# Patient Record
Sex: FEMALE | Race: WHITE | NOT HISPANIC OR LATINO | URBAN - NONMETROPOLITAN AREA
[De-identification: names, ages, dates, MRNs, and addresses within clinical notes are randomized per-mention and may not be internally consistent; named-entity substitution may affect disease eponyms.]

---

## 2020-08-18 ENCOUNTER — OFFICE VISIT (OUTPATIENT)
Dept: URBAN - NONMETROPOLITAN AREA CLINIC 2 | Facility: CLINIC | Age: 80
End: 2020-08-18
Payer: MEDICARE

## 2020-08-18 DIAGNOSIS — K58.0 IRRITABLE BOWEL SYNDROME WITH DIARRHEA: ICD-10-CM

## 2020-08-18 DIAGNOSIS — R74.8 ABNORMAL ALKALINE PHOSPHATASE TEST: ICD-10-CM

## 2020-08-18 DIAGNOSIS — Z86.010 PERSONAL HISTORY OF COLONIC POLYPS: ICD-10-CM

## 2020-08-18 PROCEDURE — 1036F TOBACCO NON-USER: CPT | Performed by: INTERNAL MEDICINE

## 2020-08-18 PROCEDURE — G9903 PT SCRN TBCO ID AS NON USER: HCPCS | Performed by: INTERNAL MEDICINE

## 2020-08-18 PROCEDURE — G8427 DOCREV CUR MEDS BY ELIG CLIN: HCPCS | Performed by: INTERNAL MEDICINE

## 2020-08-18 PROCEDURE — G8420 CALC BMI NORM PARAMETERS: HCPCS | Performed by: INTERNAL MEDICINE

## 2020-08-18 PROCEDURE — 99214 OFFICE O/P EST MOD 30 MIN: CPT | Performed by: INTERNAL MEDICINE

## 2020-08-18 RX ORDER — TURMERIC 400 MG
CAPSULE ORAL
Qty: 0 | Refills: 0 | Status: ACTIVE | COMMUNITY
Start: 1900-01-01

## 2020-08-18 RX ORDER — GLUCOSAMINE HCL/CHONDROITIN SU 500-400 MG
CAPSULE ORAL
Qty: 0 | Refills: 0 | Status: ACTIVE | COMMUNITY
Start: 1900-01-01

## 2020-08-18 RX ORDER — ERGOCALCIFEROL 1.25 MG/1
TAKE 1 CAPSULE (50,000 UNIT) BY ORAL ROUTE ONCE WEEKLY CAPSULE ORAL
Qty: 0 | Refills: 0 | Status: ACTIVE | COMMUNITY
Start: 1900-01-01

## 2020-08-18 RX ORDER — DICYCLOMINE HYDROCHLORIDE 10 MG/1
1 CAPSULE CAPSULE ORAL
Qty: 90 CAPSULE | Refills: 6 | OUTPATIENT
Start: 2020-08-18 | End: 2021-03-16

## 2020-08-18 NOTE — HPI-TODAY'S VISIT:
The patient presents today for follow-up of her diarrhea.  Since our last visit, she has been doing quite well.  She is actually stopped taking the dicyclomine, but she has added in the Metamucil.  She does feel that adding the Metamucil has given her more formed to her stool.  She still will have to have several bowel movements in the morning during her walk.  She is not having 1, formed complete bowel movement.  She denies any blood in her stool.  She does feel that stress has affected her bowels as well.  At our last visit, we also checked her liver enzymes, and these have returned to normal.  She is due for repeat colonoscopy in 2021.  Overall, she is feeling well today, but continues to have occasional issues with frequency of bowel movements.

## 2021-02-23 ENCOUNTER — LAB OUTSIDE AN ENCOUNTER (OUTPATIENT)
Dept: URBAN - NONMETROPOLITAN AREA CLINIC 2 | Facility: CLINIC | Age: 81
End: 2021-02-23

## 2021-02-23 ENCOUNTER — OFFICE VISIT (OUTPATIENT)
Dept: URBAN - NONMETROPOLITAN AREA CLINIC 2 | Facility: CLINIC | Age: 81
End: 2021-02-23
Payer: MEDICARE

## 2021-02-23 DIAGNOSIS — Z86.010 PERSONAL HISTORY OF COLONIC POLYPS: ICD-10-CM

## 2021-02-23 DIAGNOSIS — R10.13 DYSPEPSIA: ICD-10-CM

## 2021-02-23 DIAGNOSIS — R94.5 ELEVATED LFTS: ICD-10-CM

## 2021-02-23 DIAGNOSIS — K58.9 IBS (IRRITABLE BOWEL SYNDROME): ICD-10-CM

## 2021-02-23 DIAGNOSIS — R19.7 DIARRHEA: ICD-10-CM

## 2021-02-23 PROCEDURE — 99214 OFFICE O/P EST MOD 30 MIN: CPT | Performed by: INTERNAL MEDICINE

## 2021-02-23 RX ORDER — DICYCLOMINE HYDROCHLORIDE 10 MG/1
1 CAPSULE CAPSULE ORAL
Qty: 90 CAPSULE | Refills: 6 | Status: ACTIVE | COMMUNITY
Start: 2020-08-18 | End: 2021-03-16

## 2021-02-23 RX ORDER — GLUCOSAMINE HCL/CHONDROITIN SU 500-400 MG
CAPSULE ORAL
Qty: 0 | Refills: 0 | Status: ACTIVE | COMMUNITY
Start: 1900-01-01

## 2021-02-23 RX ORDER — ERGOCALCIFEROL 1.25 MG/1
TAKE 1 CAPSULE (50,000 UNIT) BY ORAL ROUTE ONCE WEEKLY CAPSULE ORAL
Qty: 0 | Refills: 0 | Status: ACTIVE | COMMUNITY
Start: 1900-01-01

## 2021-02-23 RX ORDER — TURMERIC 400 MG
CAPSULE ORAL
Qty: 0 | Refills: 0 | Status: ACTIVE | COMMUNITY
Start: 1900-01-01

## 2021-02-23 NOTE — HPI-TODAY'S VISIT:
2-23-21 The patient presents today for follow-up of her diarrhea and elevated liver enzymes.  She also has a history of colon polyps.  She is due for repeat colonoscopy today.  Her diarrhea is normally well controlled with Metamucil.  She is not taking dicyclomine.  She does occasionally have urgency when she is walking in the morning, but otherwise her bowels have been regular.  We have discussed taking the dicyclomine prior to her morning walk.  She is also been having problems with increased dyspepsia.  She occasionally has days where she feels that she needs to burp.  She is not taking anything for reflux, and she has never had an EGD.  We will proceed with EGD when we do her colonoscopy.  Overall, she is feeling well today, and she is willing to proceed with her procedures.

## 2021-03-10 ENCOUNTER — TELEPHONE ENCOUNTER (OUTPATIENT)
Dept: URBAN - NONMETROPOLITAN AREA CLINIC 2 | Facility: CLINIC | Age: 81
End: 2021-03-10

## 2021-03-11 ENCOUNTER — TELEPHONE ENCOUNTER (OUTPATIENT)
Dept: URBAN - NONMETROPOLITAN AREA CLINIC 2 | Facility: CLINIC | Age: 81
End: 2021-03-11

## 2021-03-18 ENCOUNTER — OFFICE VISIT (OUTPATIENT)
Dept: URBAN - NONMETROPOLITAN AREA SURGERY CENTER 1 | Facility: SURGERY CENTER | Age: 81
End: 2021-03-18
Payer: MEDICARE

## 2021-03-18 ENCOUNTER — CLAIMS CREATED FROM THE CLAIM WINDOW (OUTPATIENT)
Dept: URBAN - METROPOLITAN AREA CLINIC 4 | Facility: CLINIC | Age: 81
End: 2021-03-18
Payer: MEDICARE

## 2021-03-18 DIAGNOSIS — K63.89 MASS OF HEPATIC FLEXURE OF COLON: ICD-10-CM

## 2021-03-18 DIAGNOSIS — K21.00 GASTRO-ESOPHAGEAL REFLUX DISEASE WITH ESOPHAGITIS, WITHOUT BLEEDING: ICD-10-CM

## 2021-03-18 DIAGNOSIS — K21.9 ACID REFLUX: ICD-10-CM

## 2021-03-18 DIAGNOSIS — R19.7 ACUTE DIARRHEA: ICD-10-CM

## 2021-03-18 DIAGNOSIS — K63.89 BACTERIAL OVERGROWTH SYNDROME: ICD-10-CM

## 2021-03-18 DIAGNOSIS — K31.89 ISOLATED IDIOPATHIC GRANULOMA OF STOMACH: ICD-10-CM

## 2021-03-18 PROCEDURE — 88313 SPECIAL STAINS GROUP 2: CPT | Performed by: PATHOLOGY

## 2021-03-18 PROCEDURE — 43239 EGD BIOPSY SINGLE/MULTIPLE: CPT | Performed by: INTERNAL MEDICINE

## 2021-03-18 PROCEDURE — 88342 IMHCHEM/IMCYTCHM 1ST ANTB: CPT | Performed by: PATHOLOGY

## 2021-03-18 PROCEDURE — G8907 PT DOC NO EVENTS ON DISCHARG: HCPCS | Performed by: INTERNAL MEDICINE

## 2021-03-18 PROCEDURE — 45380 COLONOSCOPY AND BIOPSY: CPT | Performed by: INTERNAL MEDICINE

## 2021-03-18 PROCEDURE — 45385 COLONOSCOPY W/LESION REMOVAL: CPT | Performed by: INTERNAL MEDICINE

## 2021-03-18 PROCEDURE — 88305 TISSUE EXAM BY PATHOLOGIST: CPT | Performed by: PATHOLOGY

## 2021-03-18 RX ORDER — ERGOCALCIFEROL 1.25 MG/1
TAKE 1 CAPSULE (50,000 UNIT) BY ORAL ROUTE ONCE WEEKLY CAPSULE ORAL
Qty: 0 | Refills: 0 | Status: ACTIVE | COMMUNITY
Start: 1900-01-01

## 2021-03-18 RX ORDER — TURMERIC 400 MG
CAPSULE ORAL
Qty: 0 | Refills: 0 | Status: ACTIVE | COMMUNITY
Start: 1900-01-01

## 2021-03-18 RX ORDER — GLUCOSAMINE HCL/CHONDROITIN SU 500-400 MG
CAPSULE ORAL
Qty: 0 | Refills: 0 | Status: ACTIVE | COMMUNITY
Start: 1900-01-01

## 2021-06-24 ENCOUNTER — WEB ENCOUNTER (OUTPATIENT)
Dept: URBAN - NONMETROPOLITAN AREA CLINIC 13 | Facility: CLINIC | Age: 81
End: 2021-06-24

## 2021-06-24 ENCOUNTER — OFFICE VISIT (OUTPATIENT)
Dept: URBAN - NONMETROPOLITAN AREA CLINIC 13 | Facility: CLINIC | Age: 81
End: 2021-06-24
Payer: MEDICARE

## 2021-06-24 VITALS
SYSTOLIC BLOOD PRESSURE: 139 MMHG | BODY MASS INDEX: 23.15 KG/M2 | DIASTOLIC BLOOD PRESSURE: 83 MMHG | HEART RATE: 67 BPM | WEIGHT: 122.6 LBS | TEMPERATURE: 97.7 F | HEIGHT: 61 IN

## 2021-06-24 DIAGNOSIS — K58.9 IBS (IRRITABLE BOWEL SYNDROME): ICD-10-CM

## 2021-06-24 DIAGNOSIS — R19.7 DIARRHEA: ICD-10-CM

## 2021-06-24 DIAGNOSIS — R10.13 DYSPEPSIA: ICD-10-CM

## 2021-06-24 DIAGNOSIS — R94.5 ELEVATED LFTS: ICD-10-CM

## 2021-06-24 DIAGNOSIS — Z86.010 PERSONAL HISTORY OF COLONIC POLYPS: ICD-10-CM

## 2021-06-24 PROCEDURE — 99214 OFFICE O/P EST MOD 30 MIN: CPT | Performed by: INTERNAL MEDICINE

## 2021-06-24 RX ORDER — TURMERIC 400 MG
CAPSULE ORAL
Qty: 0 | Refills: 0 | Status: ACTIVE | COMMUNITY
Start: 1900-01-01

## 2021-06-24 RX ORDER — GLUCOSAMINE HCL/CHONDROITIN SU 500-400 MG
CAPSULE ORAL
Qty: 0 | Refills: 0 | Status: ACTIVE | COMMUNITY
Start: 1900-01-01

## 2021-06-24 RX ORDER — ERGOCALCIFEROL 1.25 MG/1
TAKE 1 CAPSULE (50,000 UNIT) BY ORAL ROUTE ONCE WEEKLY CAPSULE ORAL
Qty: 0 | Refills: 0 | Status: ACTIVE | COMMUNITY
Start: 1900-01-01

## 2021-06-24 RX ORDER — FAMOTIDINE 20 MG/1
1 TABLET AT BEDTIME TABLET, FILM COATED ORAL ONCE A DAY
Qty: 90 TABLET | Refills: 3 | OUTPATIENT
Start: 2021-06-24

## 2021-06-24 RX ORDER — DICYCLOMINE HYDROCHLORIDE 10 MG/1
1 CAPSULE CAPSULE ORAL
Qty: 30 CAPSULE | Refills: 6 | OUTPATIENT
Start: 2021-06-24 | End: 2023-03-16

## 2021-06-24 NOTE — HPI-TODAY'S VISIT:
2-23-21 The patient presents today for follow-up of her diarrhea and elevated liver enzymes.  She also has a history of colon polyps.  She is due for repeat colonoscopy today.  Her diarrhea is normally well controlled with Metamucil.  She is not taking dicyclomine.  She does occasionally have urgency when she is walking in the morning, but otherwise her bowels have been regular.  We have discussed taking the dicyclomine prior to her morning walk.  She is also been having problems with increased dyspepsia.  She occasionally has days where she feels that she needs to burp.  She is not taking anything for reflux, and she has never had an EGD.  We will proceed with EGD when we do her colonoscopy.  Overall, she is feeling well today, and she is willing to proceed with her procedures. 6/24/2021 The patient presents today for follow-up of her EGD and colonoscopy.  She was found to have 1 small benign colon polyp and some mild reflux esophagitis.  We have discussed these results today.  She does continue to have some postnasal drip with some mild dyspepsia.  We have discussed starting on Pepcid, and she does want to do this today.  Her bowels have been fairly well controlled with Metamucil in the evening and dicyclomine as needed.  She will use dicyclomine if she is going for a walk or if she is going out for the day.  Overall, she is doing quite well and her GI symptoms are controlled.

## 2021-08-17 ENCOUNTER — OFFICE VISIT (OUTPATIENT)
Dept: URBAN - NONMETROPOLITAN AREA CLINIC 2 | Facility: CLINIC | Age: 81
End: 2021-08-17

## 2022-06-15 ENCOUNTER — WEB ENCOUNTER (OUTPATIENT)
Dept: URBAN - NONMETROPOLITAN AREA CLINIC 2 | Facility: CLINIC | Age: 82
End: 2022-06-15

## 2022-06-21 ENCOUNTER — OFFICE VISIT (OUTPATIENT)
Dept: URBAN - NONMETROPOLITAN AREA CLINIC 2 | Facility: CLINIC | Age: 82
End: 2022-06-21
Payer: MEDICARE

## 2022-06-21 VITALS
BODY MASS INDEX: 23.41 KG/M2 | TEMPERATURE: 97.2 F | SYSTOLIC BLOOD PRESSURE: 145 MMHG | HEIGHT: 61 IN | DIASTOLIC BLOOD PRESSURE: 83 MMHG | HEART RATE: 73 BPM | WEIGHT: 124 LBS

## 2022-06-21 DIAGNOSIS — R13.19 ESOPHAGEAL DYSPHAGIA: ICD-10-CM

## 2022-06-21 DIAGNOSIS — Z86.010 PERSONAL HISTORY OF COLONIC POLYPS: ICD-10-CM

## 2022-06-21 DIAGNOSIS — R94.5 ELEVATED LFTS: ICD-10-CM

## 2022-06-21 DIAGNOSIS — R10.13 DYSPEPSIA: ICD-10-CM

## 2022-06-21 DIAGNOSIS — K58.9 IBS (IRRITABLE BOWEL SYNDROME): ICD-10-CM

## 2022-06-21 DIAGNOSIS — R19.7 DIARRHEA: ICD-10-CM

## 2022-06-21 PROCEDURE — 99214 OFFICE O/P EST MOD 30 MIN: CPT | Performed by: INTERNAL MEDICINE

## 2022-06-21 RX ORDER — ERGOCALCIFEROL 1.25 MG/1
TAKE 1 CAPSULE (50,000 UNIT) BY ORAL ROUTE ONCE WEEKLY CAPSULE ORAL
Qty: 0 | Refills: 0 | Status: ACTIVE | COMMUNITY
Start: 1900-01-01

## 2022-06-21 RX ORDER — TURMERIC 400 MG
CAPSULE ORAL
Qty: 0 | Refills: 0 | Status: ACTIVE | COMMUNITY
Start: 1900-01-01

## 2022-06-21 RX ORDER — DICYCLOMINE HYDROCHLORIDE 10 MG/1
1 CAPSULE CAPSULE ORAL
Qty: 30 CAPSULE | Refills: 6 | Status: ACTIVE | COMMUNITY
Start: 2021-06-24 | End: 2023-03-16

## 2022-06-21 RX ORDER — DICYCLOMINE HYDROCHLORIDE 10 MG/1
1 CAPSULE CAPSULE ORAL
Qty: 30 CAPSULE | Refills: 6
Start: 2021-06-24 | End: 2024-03-12

## 2022-06-21 RX ORDER — FAMOTIDINE 20 MG/1
1 TABLET AT BEDTIME TABLET, FILM COATED ORAL ONCE A DAY
Qty: 90 TABLET | Refills: 3
Start: 2021-06-24

## 2022-06-21 RX ORDER — FAMOTIDINE 20 MG/1
1 TABLET AT BEDTIME TABLET, FILM COATED ORAL ONCE A DAY
Qty: 90 TABLET | Refills: 3 | Status: ACTIVE | COMMUNITY
Start: 2021-06-24

## 2022-06-21 RX ORDER — GLUCOSAMINE HCL/CHONDROITIN SU 500-400 MG
CAPSULE ORAL
Qty: 0 | Refills: 0 | Status: ACTIVE | COMMUNITY
Start: 1900-01-01

## 2022-06-21 NOTE — HPI-TODAY'S VISIT:
2-23-21 The patient presents today for follow-up of her diarrhea and elevated liver enzymes.  She also has a history of colon polyps.  She is due for repeat colonoscopy today.  Her diarrhea is normally well controlled with Metamucil.  She is not taking dicyclomine.  She does occasionally have urgency when she is walking in the morning, but otherwise her bowels have been regular.  We have discussed taking the dicyclomine prior to her morning walk.  She is also been having problems with increased dyspepsia.  She occasionally has days where she feels that she needs to burp.  She is not taking anything for reflux, and she has never had an EGD.  We will proceed with EGD when we do her colonoscopy.  Overall, she is feeling well today, and she is willing to proceed with her procedures. 6/24/2021 The patient presents today for follow-up of her EGD and colonoscopy.  She was found to have 1 small benign colon polyp and some mild reflux esophagitis.  We have discussed these results today.  She does continue to have some postnasal drip with some mild dyspepsia.  We have discussed starting on Pepcid, and she does want to do this today.  Her bowels have been fairly well controlled with Metamucil in the evening and dicyclomine as needed.  She will use dicyclomine if she is going for a walk or if she is going out for the day.  Overall, she is doing quite well and her GI symptoms are controlled. 6/21/2022 The patient presents today for follow-up of her diarrhea and reflux.  Since her last visit, she is doing quite well on dicyclomine in the morning.  She will take another dicyclomine when she goes out.  Sometimes she feels that she needs to, and sometimes she feels that 1 controls her symptoms.  Her reflux has been well controlled on famotidine at night.  She does occasionally feel that food gets stuck in her esophagus.  She feels that she has a very small esophagus.  We have discussed repeating her EGD with dilation, but she does not think she needs to proceed with this at this time.  Overall, she continues to do quite well.  We will see her back in the office in 6 months.  If she does have worsening problems with dysphagia, then I would schedule her for an EGD sooner.

## 2022-06-24 ENCOUNTER — OFFICE VISIT (OUTPATIENT)
Dept: URBAN - NONMETROPOLITAN AREA CLINIC 13 | Facility: CLINIC | Age: 82
End: 2022-06-24

## 2023-01-03 ENCOUNTER — OFFICE VISIT (OUTPATIENT)
Dept: URBAN - NONMETROPOLITAN AREA CLINIC 2 | Facility: CLINIC | Age: 83
End: 2023-01-03
Payer: MEDICARE

## 2023-01-03 VITALS
WEIGHT: 126 LBS | HEIGHT: 61 IN | HEART RATE: 65 BPM | SYSTOLIC BLOOD PRESSURE: 146 MMHG | BODY MASS INDEX: 23.79 KG/M2 | DIASTOLIC BLOOD PRESSURE: 81 MMHG | TEMPERATURE: 97 F

## 2023-01-03 DIAGNOSIS — K58.9 IBS (IRRITABLE BOWEL SYNDROME): ICD-10-CM

## 2023-01-03 DIAGNOSIS — R10.13 DYSPEPSIA: ICD-10-CM

## 2023-01-03 DIAGNOSIS — R13.19 ESOPHAGEAL DYSPHAGIA: ICD-10-CM

## 2023-01-03 DIAGNOSIS — R19.7 DIARRHEA: ICD-10-CM

## 2023-01-03 DIAGNOSIS — R94.5 ELEVATED LFTS: ICD-10-CM

## 2023-01-03 DIAGNOSIS — Z86.010 PERSONAL HISTORY OF COLONIC POLYPS: ICD-10-CM

## 2023-01-03 PROBLEM — 428283002: Status: ACTIVE | Noted: 2020-08-18

## 2023-01-03 PROBLEM — 162031009: Status: ACTIVE | Noted: 2021-02-23

## 2023-01-03 PROCEDURE — 99214 OFFICE O/P EST MOD 30 MIN: CPT | Performed by: INTERNAL MEDICINE

## 2023-01-03 RX ORDER — DICYCLOMINE HYDROCHLORIDE 10 MG/1
1 CAPSULE CAPSULE ORAL
Qty: 30 CAPSULE | Refills: 6 | Status: ACTIVE | COMMUNITY
Start: 2021-06-24 | End: 2024-03-12

## 2023-01-03 RX ORDER — GLUCOSAMINE HCL/CHONDROITIN SU 500-400 MG
CAPSULE ORAL
Qty: 0 | Refills: 0 | Status: ACTIVE | COMMUNITY
Start: 1900-01-01

## 2023-01-03 RX ORDER — ERGOCALCIFEROL 1.25 MG/1
TAKE 1 CAPSULE (50,000 UNIT) BY ORAL ROUTE ONCE WEEKLY CAPSULE ORAL
Qty: 0 | Refills: 0 | Status: ACTIVE | COMMUNITY
Start: 1900-01-01

## 2023-01-03 RX ORDER — TURMERIC 400 MG
CAPSULE ORAL
Qty: 0 | Refills: 0 | Status: ACTIVE | COMMUNITY
Start: 1900-01-01

## 2023-01-03 RX ORDER — FAMOTIDINE 20 MG/1
1 TABLET AT BEDTIME TABLET, FILM COATED ORAL ONCE A DAY
Qty: 90 TABLET | Refills: 3 | Status: ACTIVE | COMMUNITY
Start: 2021-06-24

## 2023-01-03 NOTE — HPI-TODAY'S VISIT:
2-23-21 The patient presents today for follow-up of her diarrhea and elevated liver enzymes.  She also has a history of colon polyps.  She is due for repeat colonoscopy today.  Her diarrhea is normally well controlled with Metamucil.  She is not taking dicyclomine.  She does occasionally have urgency when she is walking in the morning, but otherwise her bowels have been regular.  We have discussed taking the dicyclomine prior to her morning walk.  She is also been having problems with increased dyspepsia.  She occasionally has days where she feels that she needs to burp.  She is not taking anything for reflux, and she has never had an EGD.  We will proceed with EGD when we do her colonoscopy.  Overall, she is feeling well today, and she is willing to proceed with her procedures. 6/24/2021 The patient presents today for follow-up of her EGD and colonoscopy.  She was found to have 1 small benign colon polyp and some mild reflux esophagitis.  We have discussed these results today.  She does continue to have some postnasal drip with some mild dyspepsia.  We have discussed starting on Pepcid, and she does want to do this today.  Her bowels have been fairly well controlled with Metamucil in the evening and dicyclomine as needed.  She will use dicyclomine if she is going for a walk or if she is going out for the day.  Overall, she is doing quite well and her GI symptoms are controlled. 6/21/2022 The patient presents today for follow-up of her diarrhea and reflux.  Since her last visit, she is doing quite well on dicyclomine in the morning.  She will take another dicyclomine when she goes out.  Sometimes she feels that she needs to, and sometimes she feels that 1 controls her symptoms.  Her reflux has been well controlled on famotidine at night.  She does occasionally feel that food gets stuck in her esophagus.  She feels that she has a very small esophagus.  We have discussed repeating her EGD with dilation, but she does not think she needs to proceed with this at this time.  Overall, she continues to do quite well.  We will see her back in the office in 6 months.  If she does have worsening problems with dysphagia, then I would schedule her for an EGD sooner. 1/3/2023 The patient presents today for follow-up of her reflux and diarrhea.  Since her last visit, she is doing well on famotidine 20 mg at night.  She does still feel that occasionally food gets stuck in the top of her esophagus.  She does feel that she has difficulty swallowing sometimes.  She does not think we need to repeat her EGD with dilation at this time.  She does continue to struggle with her diarrhea.  She is taking the Metamucil daily, and she does think that this helps.  She often forgets to take the dicyclomine.  Today, we have discussed taking the dicyclomine on a more regular basis.  We will see her back in the office in 6 months for further evaluation.

## 2023-01-04 ENCOUNTER — TELEPHONE ENCOUNTER (OUTPATIENT)
Dept: URBAN - NONMETROPOLITAN AREA CLINIC 2 | Facility: CLINIC | Age: 83
End: 2023-01-04

## 2023-04-20 ENCOUNTER — TELEPHONE ENCOUNTER (OUTPATIENT)
Dept: URBAN - NONMETROPOLITAN AREA CLINIC 2 | Facility: CLINIC | Age: 83
End: 2023-04-20

## 2023-04-20 RX ORDER — LACTOBACIL 2/BIFIDO 1/S.THERMO 900B CELL
1 PACKET PACKET (EA) ORAL DAILY
Qty: 30 PACKET | Refills: 11 | OUTPATIENT
Start: 2023-04-20 | End: 2024-04-14

## 2023-06-05 ENCOUNTER — OFFICE VISIT (OUTPATIENT)
Dept: URBAN - NONMETROPOLITAN AREA CLINIC 2 | Facility: CLINIC | Age: 83
End: 2023-06-05

## 2023-06-28 ENCOUNTER — ERX REFILL RESPONSE (OUTPATIENT)
Dept: URBAN - NONMETROPOLITAN AREA CLINIC 2 | Facility: CLINIC | Age: 83
End: 2023-06-28

## 2023-06-28 RX ORDER — FAMOTIDINE 20 MG/1
1 TABLET AT BEDTIME TABLET, FILM COATED ORAL ONCE A DAY
Qty: 90 TABLET | Refills: 3 | OUTPATIENT

## 2023-08-08 ENCOUNTER — OFFICE VISIT (OUTPATIENT)
Dept: URBAN - NONMETROPOLITAN AREA CLINIC 2 | Facility: CLINIC | Age: 83
End: 2023-08-08
Payer: MEDICARE

## 2023-08-08 ENCOUNTER — LAB OUTSIDE AN ENCOUNTER (OUTPATIENT)
Dept: URBAN - NONMETROPOLITAN AREA CLINIC 2 | Facility: CLINIC | Age: 83
End: 2023-08-08

## 2023-08-08 VITALS
DIASTOLIC BLOOD PRESSURE: 73 MMHG | SYSTOLIC BLOOD PRESSURE: 143 MMHG | WEIGHT: 121.6 LBS | TEMPERATURE: 98.7 F | BODY MASS INDEX: 22.96 KG/M2 | HEART RATE: 71 BPM | HEIGHT: 61 IN

## 2023-08-08 DIAGNOSIS — R13.19 ESOPHAGEAL DYSPHAGIA: ICD-10-CM

## 2023-08-08 DIAGNOSIS — K58.1 IBS: ICD-10-CM

## 2023-08-08 DIAGNOSIS — R94.5 ELEVATED LFTS: ICD-10-CM

## 2023-08-08 DIAGNOSIS — R19.7 DIARRHEA: ICD-10-CM

## 2023-08-08 PROBLEM — 40890009: Status: ACTIVE | Noted: 2022-06-21

## 2023-08-08 PROCEDURE — 99214 OFFICE O/P EST MOD 30 MIN: CPT | Performed by: INTERNAL MEDICINE

## 2023-08-08 RX ORDER — TURMERIC 400 MG
CAPSULE ORAL
Qty: 0 | Refills: 0 | Status: ACTIVE | COMMUNITY
Start: 1900-01-01

## 2023-08-08 RX ORDER — DICYCLOMINE HYDROCHLORIDE 10 MG/1
1 CAPSULE CAPSULE ORAL
Qty: 30 CAPSULE | Refills: 6 | Status: ACTIVE | COMMUNITY
Start: 2021-06-24 | End: 2024-03-12

## 2023-08-08 RX ORDER — ERGOCALCIFEROL 1.25 MG/1
TAKE 1 CAPSULE (50,000 UNIT) BY ORAL ROUTE ONCE WEEKLY CAPSULE ORAL
Qty: 0 | Refills: 0 | Status: ACTIVE | COMMUNITY
Start: 1900-01-01

## 2023-08-08 RX ORDER — LACTOBACIL 2/BIFIDO 1/S.THERMO 900B CELL
1 PACKET PACKET (EA) ORAL DAILY
Qty: 30 PACKET | Refills: 11 | Status: ACTIVE | COMMUNITY
Start: 2023-04-20 | End: 2024-04-14

## 2023-08-08 RX ORDER — FAMOTIDINE 20 MG/1
1 TABLET AT BEDTIME TABLET, FILM COATED ORAL ONCE A DAY
Qty: 90 TABLET | Refills: 3 | Status: ACTIVE | COMMUNITY

## 2023-08-08 RX ORDER — GLUCOSAMINE HCL/CHONDROITIN SU 500-400 MG
CAPSULE ORAL
Qty: 0 | Refills: 0 | Status: ACTIVE | COMMUNITY
Start: 1900-01-01

## 2023-08-08 NOTE — HPI-TODAY'S VISIT:
2-23-21 The patient presents today for follow-up of her diarrhea and elevated liver enzymes.  She also has a history of colon polyps.  She is due for repeat colonoscopy today.  Her diarrhea is normally well controlled with Metamucil.  She is not taking dicyclomine.  She does occasionally have urgency when she is walking in the morning, but otherwise her bowels have been regular.  We have discussed taking the dicyclomine prior to her morning walk.  She is also been having problems with increased dyspepsia.  She occasionally has days where she feels that she needs to burp.  She is not taking anything for reflux, and she has never had an EGD.  We will proceed with EGD when we do her colonoscopy.  Overall, she is feeling well today, and she is willing to proceed with her procedures. 6/24/2021 The patient presents today for follow-up of her EGD and colonoscopy.  She was found to have 1 small benign colon polyp and some mild reflux esophagitis.  We have discussed these results today.  She does continue to have some postnasal drip with some mild dyspepsia.  We have discussed starting on Pepcid, and she does want to do this today.  Her bowels have been fairly well controlled with Metamucil in the evening and dicyclomine as needed.  She will use dicyclomine if she is going for a walk or if she is going out for the day.  Overall, she is doing quite well and her GI symptoms are controlled. 6/21/2022 The patient presents today for follow-up of her diarrhea and reflux.  Since her last visit, she is doing quite well on dicyclomine in the morning.  She will take another dicyclomine when she goes out.  Sometimes she feels that she needs to, and sometimes she feels that 1 controls her symptoms.  Her reflux has been well controlled on famotidine at night.  She does occasionally feel that food gets stuck in her esophagus.  She feels that she has a very small esophagus.  We have discussed repeating her EGD with dilation, but she does not think she needs to proceed with this at this time.  Overall, she continues to do quite well.  We will see her back in the office in 6 months.  If she does have worsening problems with dysphagia, then I would schedule her for an EGD sooner. 1/3/2023 The patient presents today for follow-up of her reflux and diarrhea.  Since her last visit, she is doing well on famotidine 20 mg at night.  She does still feel that occasionally food gets stuck in the top of her esophagus.  She does feel that she has difficulty swallowing sometimes.  She does not think we need to repeat her EGD with dilation at this time.  She does continue to struggle with her diarrhea.  She is taking the Metamucil daily, and she does think that this helps.  She often forgets to take the dicyclomine.  Today, we have discussed taking the dicyclomine on a more regular basis.  We will see her back in the office in 6 months for further evaluation. 8/8/2023 The patient presents today for follow-up of her reflux with dysphagia and diarrhea.  Since her last visit, she continues on famotidine 20 mg at night with Metamucil in the morning and dicyclomine in the morning as needed.  She continues to complain of occasional dysphagia.  She feels like solids occasionally gets stuck in her throat.  She is also had worsening of her cough.  Today, we have discussed adding in a PPI, but given her osteoporosis, she wants to hold off for now.  We have discussed we will proceed with EGD, and depending on these results, we may want to take a famotidine with low-dose PPI for period of time.  In the meantime, her bowels seem to be moving quite well.  She takes the dicyclomine as needed.  She does take magnesium every night to help her sleep, and we have discussed that this may be contributing to her diarrhea as well.  We will see her back in the office in 6 months for further evaluation.

## 2023-09-18 ENCOUNTER — ERX REFILL RESPONSE (OUTPATIENT)
Dept: URBAN - NONMETROPOLITAN AREA CLINIC 2 | Facility: CLINIC | Age: 83
End: 2023-09-18

## 2023-09-18 RX ORDER — DICYCLOMINE HYDROCHLORIDE 10 MG/1
1 CAPSULE 30 MINUTES BEFORE EATING CAPSULE ORAL THREE TIMES A DAY
Qty: 270 CAPSULES | Refills: 3 | OUTPATIENT

## 2023-09-18 RX ORDER — DICYCLOMINE HYDROCHLORIDE 10 MG/1
1 CAPSULE CAPSULE ORAL
Qty: 30 CAPSULE | Refills: 6 | OUTPATIENT

## 2023-09-26 ENCOUNTER — CLAIMS CREATED FROM THE CLAIM WINDOW (OUTPATIENT)
Dept: URBAN - METROPOLITAN AREA CLINIC 4 | Facility: CLINIC | Age: 83
End: 2023-09-26
Payer: MEDICARE

## 2023-09-26 ENCOUNTER — OFFICE VISIT (OUTPATIENT)
Dept: URBAN - NONMETROPOLITAN AREA SURGERY CENTER 1 | Facility: SURGERY CENTER | Age: 83
End: 2023-09-26
Payer: MEDICARE

## 2023-09-26 DIAGNOSIS — K21.9 ACID REFLUX: ICD-10-CM

## 2023-09-26 DIAGNOSIS — K20.90 ESOPHAGITIS: ICD-10-CM

## 2023-09-26 DIAGNOSIS — K31.89 OTHER DISEASES OF STOMACH AND DUODENUM: ICD-10-CM

## 2023-09-26 DIAGNOSIS — K31.9 ERYTHEMA OF GASTRIC ANTRUM: ICD-10-CM

## 2023-09-26 DIAGNOSIS — K31.89 ACHYLIA: ICD-10-CM

## 2023-09-26 DIAGNOSIS — K44.9 HIATAL HERNIA: ICD-10-CM

## 2023-09-26 DIAGNOSIS — K22.2 SCHATZKI'S RING: ICD-10-CM

## 2023-09-26 DIAGNOSIS — K29.70 GASTRITIS, UNSPECIFIED, WITHOUT BLEEDING: ICD-10-CM

## 2023-09-26 DIAGNOSIS — K22.2 ACQUIRED ESOPHAGEAL RING: ICD-10-CM

## 2023-09-26 PROCEDURE — 88305 TISSUE EXAM BY PATHOLOGIST: CPT | Performed by: PATHOLOGY

## 2023-09-26 PROCEDURE — 00731 ANES UPR GI NDSC PX NOS: CPT | Performed by: NURSE ANESTHETIST, CERTIFIED REGISTERED

## 2023-09-26 PROCEDURE — 43239 EGD BIOPSY SINGLE/MULTIPLE: CPT | Performed by: INTERNAL MEDICINE

## 2023-09-26 PROCEDURE — 43249 ESOPH EGD DILATION <30 MM: CPT | Performed by: INTERNAL MEDICINE

## 2023-09-26 PROCEDURE — G8907 PT DOC NO EVENTS ON DISCHARG: HCPCS | Performed by: INTERNAL MEDICINE

## 2023-09-26 PROCEDURE — 88312 SPECIAL STAINS GROUP 1: CPT | Performed by: PATHOLOGY

## 2023-09-26 RX ORDER — DICYCLOMINE HYDROCHLORIDE 10 MG/1
1 CAPSULE 30 MINUTES BEFORE EATING CAPSULE ORAL THREE TIMES A DAY
Qty: 270 CAPSULES | Refills: 3 | Status: ACTIVE | COMMUNITY

## 2023-09-26 RX ORDER — LACTOBACIL 2/BIFIDO 1/S.THERMO 900B CELL
1 PACKET PACKET (EA) ORAL DAILY
Qty: 30 PACKET | Refills: 11 | Status: ACTIVE | COMMUNITY
Start: 2023-04-20 | End: 2024-04-14

## 2023-09-26 RX ORDER — GLUCOSAMINE HCL/CHONDROITIN SU 500-400 MG
CAPSULE ORAL
Qty: 0 | Refills: 0 | Status: ACTIVE | COMMUNITY
Start: 1900-01-01

## 2023-09-26 RX ORDER — ERGOCALCIFEROL 1.25 MG/1
TAKE 1 CAPSULE (50,000 UNIT) BY ORAL ROUTE ONCE WEEKLY CAPSULE ORAL
Qty: 0 | Refills: 0 | Status: ACTIVE | COMMUNITY
Start: 1900-01-01

## 2023-09-26 RX ORDER — TURMERIC 400 MG
CAPSULE ORAL
Qty: 0 | Refills: 0 | Status: ACTIVE | COMMUNITY
Start: 1900-01-01

## 2023-09-26 RX ORDER — FAMOTIDINE 20 MG/1
1 TABLET AT BEDTIME TABLET, FILM COATED ORAL ONCE A DAY
Qty: 90 TABLET | Refills: 3 | Status: ACTIVE | COMMUNITY

## 2023-09-27 ENCOUNTER — TELEPHONE ENCOUNTER (OUTPATIENT)
Dept: URBAN - NONMETROPOLITAN AREA CLINIC 2 | Facility: CLINIC | Age: 83
End: 2023-09-27

## 2023-09-27 RX ORDER — DICYCLOMINE HYDROCHLORIDE 10 MG/1
1 CAPSULE 30 MINUTES BEFORE EATING CAPSULE ORAL THREE TIMES A DAY
Qty: 90 CAPSULES | Refills: 11

## 2023-09-27 RX ORDER — FAMOTIDINE 20 MG/1
1 TABLET AT BEDTIME TABLET ORAL ONCE A DAY
Qty: 30 TABLET | Refills: 11

## 2023-10-03 ENCOUNTER — TELEPHONE ENCOUNTER (OUTPATIENT)
Dept: URBAN - NONMETROPOLITAN AREA CLINIC 2 | Facility: CLINIC | Age: 83
End: 2023-10-03

## 2024-02-06 ENCOUNTER — OV EP (OUTPATIENT)
Dept: URBAN - NONMETROPOLITAN AREA CLINIC 2 | Facility: CLINIC | Age: 84
End: 2024-02-06

## 2024-03-26 ENCOUNTER — OV EP (OUTPATIENT)
Dept: URBAN - NONMETROPOLITAN AREA CLINIC 2 | Facility: CLINIC | Age: 84
End: 2024-03-26
Payer: MEDICARE

## 2024-03-26 VITALS
DIASTOLIC BLOOD PRESSURE: 78 MMHG | HEART RATE: 64 BPM | TEMPERATURE: 98 F | WEIGHT: 123 LBS | SYSTOLIC BLOOD PRESSURE: 145 MMHG | HEIGHT: 61 IN | BODY MASS INDEX: 23.22 KG/M2

## 2024-03-26 DIAGNOSIS — Z86.010 PERSONAL HISTORY OF COLONIC POLYPS: ICD-10-CM

## 2024-03-26 DIAGNOSIS — K22.2 SCHATZKI'S RING: ICD-10-CM

## 2024-03-26 DIAGNOSIS — R10.13 DYSPEPSIA: ICD-10-CM

## 2024-03-26 DIAGNOSIS — K58.9 IBS (IRRITABLE BOWEL SYNDROME): ICD-10-CM

## 2024-03-26 DIAGNOSIS — R13.19 ESOPHAGEAL DYSPHAGIA: ICD-10-CM

## 2024-03-26 DIAGNOSIS — R94.5 ELEVATED LFTS: ICD-10-CM

## 2024-03-26 DIAGNOSIS — K21.00 GASTROESOPHAGEAL REFLUX DISEASE WITH ESOPHAGITIS WITHOUT HEMORRHAGE: ICD-10-CM

## 2024-03-26 DIAGNOSIS — R19.7 DIARRHEA: ICD-10-CM

## 2024-03-26 PROBLEM — 266433003: Status: ACTIVE | Noted: 2024-03-26

## 2024-03-26 PROCEDURE — 99214 OFFICE O/P EST MOD 30 MIN: CPT | Performed by: INTERNAL MEDICINE

## 2024-03-26 RX ORDER — FAMOTIDINE 20 MG/1
1 TABLET AT BEDTIME TABLET ORAL ONCE A DAY
Qty: 30 TABLET | Refills: 11 | Status: ACTIVE | COMMUNITY

## 2024-03-26 RX ORDER — ESOMEPRAZOLE MAGNESIUM 20 MG/1
1 CAPSULE CAPSULE, DELAYED RELEASE ORAL ONCE A DAY
Qty: 90 CAPSULE | Refills: 3 | OUTPATIENT
Start: 2024-03-26

## 2024-03-26 RX ORDER — TURMERIC 400 MG
CAPSULE ORAL
Qty: 0 | Refills: 0 | Status: ACTIVE | COMMUNITY
Start: 1900-01-01

## 2024-03-26 RX ORDER — ERGOCALCIFEROL 1.25 MG/1
TAKE 1 CAPSULE (50,000 UNIT) BY ORAL ROUTE ONCE WEEKLY CAPSULE ORAL
Qty: 0 | Refills: 0 | Status: ACTIVE | COMMUNITY
Start: 1900-01-01

## 2024-03-26 RX ORDER — PRAVASTATIN SODIUM 10 MG/1
2 TABLETS TABLET ORAL ONCE A DAY
Status: ACTIVE | COMMUNITY

## 2024-03-26 RX ORDER — DICYCLOMINE HYDROCHLORIDE 10 MG/1
1 CAPSULE 30 MINUTES BEFORE EATING CAPSULE ORAL THREE TIMES A DAY
Qty: 90 CAPSULES | Refills: 11 | Status: ACTIVE | COMMUNITY

## 2024-03-26 RX ORDER — LACTOBACIL 2/BIFIDO 1/S.THERMO 900B CELL
1 PACKET PACKET (EA) ORAL DAILY
Qty: 30 PACKET | Refills: 11 | Status: ACTIVE | COMMUNITY
Start: 2023-04-20 | End: 2024-04-14

## 2024-03-26 NOTE — HPI-TODAY'S VISIT:
2-23-21 The patient presents today for follow-up of her diarrhea and elevated liver enzymes.  She also has a history of colon polyps.  She is due for repeat colonoscopy today.  Her diarrhea is normally well controlled with Metamucil.  She is not taking dicyclomine.  She does occasionally have urgency when she is walking in the morning, but otherwise her bowels have been regular.  We have discussed taking the dicyclomine prior to her morning walk.  She is also been having problems with increased dyspepsia.  She occasionally has days where she feels that she needs to burp.  She is not taking anything for reflux, and she has never had an EGD.  We will proceed with EGD when we do her colonoscopy.  Overall, she is feeling well today, and she is willing to proceed with her procedures. 6/24/2021 The patient presents today for follow-up of her EGD and colonoscopy.  She was found to have 1 small benign colon polyp and some mild reflux esophagitis.  We have discussed these results today.  She does continue to have some postnasal drip with some mild dyspepsia.  We have discussed starting on Pepcid, and she does want to do this today.  Her bowels have been fairly well controlled with Metamucil in the evening and dicyclomine as needed.  She will use dicyclomine if she is going for a walk or if she is going out for the day.  Overall, she is doing quite well and her GI symptoms are controlled. 6/21/2022 The patient presents today for follow-up of her diarrhea and reflux.  Since her last visit, she is doing quite well on dicyclomine in the morning.  She will take another dicyclomine when she goes out.  Sometimes she feels that she needs to, and sometimes she feels that 1 controls her symptoms.  Her reflux has been well controlled on famotidine at night.  She does occasionally feel that food gets stuck in her esophagus.  She feels that she has a very small esophagus.  We have discussed repeating her EGD with dilation, but she does not think she needs to proceed with this at this time.  Overall, she continues to do quite well.  We will see her back in the office in 6 months.  If she does have worsening problems with dysphagia, then I would schedule her for an EGD sooner. 1/3/2023 The patient presents today for follow-up of her reflux and diarrhea.  Since her last visit, she is doing well on famotidine 20 mg at night.  She does still feel that occasionally food gets stuck in the top of her esophagus.  She does feel that she has difficulty swallowing sometimes.  She does not think we need to repeat her EGD with dilation at this time.  She does continue to struggle with her diarrhea.  She is taking the Metamucil daily, and she does think that this helps.  She often forgets to take the dicyclomine.  Today, we have discussed taking the dicyclomine on a more regular basis.  We will see her back in the office in 6 months for further evaluation. 8/8/2023 The patient presents today for follow-up of her reflux with dysphagia and diarrhea.  Since her last visit, she continues on famotidine 20 mg at night with Metamucil in the morning and dicyclomine in the morning as needed.  She continues to complain of occasional dysphagia.  She feels like solids occasionally gets stuck in her throat.  She is also had worsening of her cough.  Today, we have discussed adding in a PPI, but given her osteoporosis, she wants to hold off for now.  We have discussed we will proceed with EGD, and depending on these results, we may want to take a famotidine with low-dose PPI for period of time.  In the meantime, her bowels seem to be moving quite well.  She takes the dicyclomine as needed.  She does take magnesium every night to help her sleep, and we have discussed that this may be contributing to her diarrhea as well.  We will see her back in the office in 6 months for further evaluation. 3/26/2024 The patient presents today for follow-up of her reflux with dysphagia and diarrhea.  Since her last visit, we did perform an EGD with dilation.  Her dysphagia has improved, but she continues to have worsening symptoms of globus and phlegm production.  We have discussed that this may be secondary to allergies along with reflux, but we are going to start her on low-dose PPI.  We are going to start esomeprazole 20 mg daily and stop her famotidine.  She continues to have loose stools in the morning occasionally with urgency.  We are also going to increase her Metamucil to 2 tablets at night.  She denies any blood in her stool.  She is having regular bowel movements.  Overall, she is feeling well from a GI standpoint.  We will see her back in the office in 6 months for further evaluation.

## 2024-09-24 ENCOUNTER — DASHBOARD ENCOUNTERS (OUTPATIENT)
Age: 84
End: 2024-09-24

## 2024-09-24 ENCOUNTER — OFFICE VISIT (OUTPATIENT)
Dept: URBAN - NONMETROPOLITAN AREA CLINIC 2 | Facility: CLINIC | Age: 84
End: 2024-09-24
Payer: MEDICARE

## 2024-09-24 VITALS
WEIGHT: 121.6 LBS | SYSTOLIC BLOOD PRESSURE: 138 MMHG | HEART RATE: 73 BPM | DIASTOLIC BLOOD PRESSURE: 79 MMHG | HEIGHT: 61 IN | BODY MASS INDEX: 22.96 KG/M2

## 2024-09-24 DIAGNOSIS — R94.5 ELEVATED LFTS: ICD-10-CM

## 2024-09-24 DIAGNOSIS — K58.9 IBS (IRRITABLE BOWEL SYNDROME): ICD-10-CM

## 2024-09-24 DIAGNOSIS — Z86.010 PERSONAL HISTORY OF COLONIC POLYPS: ICD-10-CM

## 2024-09-24 DIAGNOSIS — R19.7 DIARRHEA: ICD-10-CM

## 2024-09-24 DIAGNOSIS — R13.19 ESOPHAGEAL DYSPHAGIA: ICD-10-CM

## 2024-09-24 DIAGNOSIS — K21.00 GASTROESOPHAGEAL REFLUX DISEASE WITH ESOPHAGITIS WITHOUT HEMORRHAGE: ICD-10-CM

## 2024-09-24 PROCEDURE — 99214 OFFICE O/P EST MOD 30 MIN: CPT | Performed by: NURSE PRACTITIONER

## 2024-09-24 RX ORDER — TURMERIC 400 MG
CAPSULE ORAL
Qty: 0 | Refills: 0 | Status: ACTIVE | COMMUNITY
Start: 1900-01-01

## 2024-09-24 RX ORDER — ESOMEPRAZOLE MAGNESIUM 20 MG/1
1 CAPSULE CAPSULE, DELAYED RELEASE ORAL ONCE A DAY
Qty: 90 CAPSULE | Refills: 3 | Status: ACTIVE | COMMUNITY
Start: 2024-03-26

## 2024-09-24 RX ORDER — FAMOTIDINE 40 MG/1
1 TABLET AT BEDTIME TABLET, FILM COATED ORAL ONCE A DAY
Qty: 90 TABLET | Refills: 3 | OUTPATIENT
Start: 2024-09-24

## 2024-09-24 RX ORDER — COLESTIPOL HYDROCHLORIDE 1 G/1
1 TABLET FIRST THING IN THE AM, SECOND DOSE AT 3 PM AS NEEDED FOR DIARRHEA TABLET, FILM COATED ORAL TWICE DAILY
Qty: 180 | Refills: 3 | OUTPATIENT
Start: 2024-09-24

## 2024-09-24 RX ORDER — FAMOTIDINE 20 MG/1
1 TABLET AT BEDTIME TABLET ORAL ONCE A DAY
Qty: 30 TABLET | Refills: 11 | Status: ACTIVE | COMMUNITY

## 2024-09-24 RX ORDER — ERGOCALCIFEROL 1.25 MG/1
TAKE 1 CAPSULE (50,000 UNIT) BY ORAL ROUTE ONCE WEEKLY CAPSULE ORAL
Qty: 0 | Refills: 0 | Status: ACTIVE | COMMUNITY
Start: 1900-01-01

## 2024-09-24 RX ORDER — ESOMEPRAZOLE MAGNESIUM 20 MG/1
1 CAPSULE BEFORE SUPPER CAPSULE, DELAYED RELEASE ORAL ONCE A DAY
Qty: 90 CAPSULE | Refills: 3
Start: 2024-03-26

## 2024-09-24 RX ORDER — PRAVASTATIN SODIUM 10 MG/1
2 TABLETS TABLET ORAL ONCE A DAY
Status: ACTIVE | COMMUNITY

## 2024-09-24 RX ORDER — DICYCLOMINE HYDROCHLORIDE 10 MG/1
1 CAPSULE 30 MINUTES BEFORE EATING CAPSULE ORAL THREE TIMES A DAY
Qty: 90 CAPSULES | Refills: 11 | Status: ACTIVE | COMMUNITY

## 2024-09-24 NOTE — HPI-TODAY'S VISIT:
2-23-21 The patient presents today for follow-up of her diarrhea and elevated liver enzymes.  She also has a history of colon polyps.  She is due for repeat colonoscopy today.  Her diarrhea is normally well controlled with Metamucil.  She is not taking dicyclomine.  She does occasionally have urgency when she is walking in the morning, but otherwise her bowels have been regular.  We have discussed taking the dicyclomine prior to her morning walk.  She is also been having problems with increased dyspepsia.  She occasionally has days where she feels that she needs to burp.  She is not taking anything for reflux, and she has never had an EGD.  We will proceed with EGD when we do her colonoscopy.  Overall, she is feeling well today, and she is willing to proceed with her procedures. 6/24/2021 The patient presents today for follow-up of her EGD and colonoscopy.  She was found to have 1 small benign colon polyp and some mild reflux esophagitis.  We have discussed these results today.  She does continue to have some postnasal drip with some mild dyspepsia.  We have discussed starting on Pepcid, and she does want to do this today.  Her bowels have been fairly well controlled with Metamucil in the evening and dicyclomine as needed.  She will use dicyclomine if she is going for a walk or if she is going out for the day.  Overall, she is doing quite well and her GI symptoms are controlled. 6/21/2022 The patient presents today for follow-up of her diarrhea and reflux.  Since her last visit, she is doing quite well on dicyclomine in the morning.  She will take another dicyclomine when she goes out.  Sometimes she feels that she needs to, and sometimes she feels that 1 controls her symptoms.  Her reflux has been well controlled on famotidine at night.  She does occasionally feel that food gets stuck in her esophagus.  She feels that she has a very small esophagus.  We have discussed repeating her EGD with dilation, but she does not think she needs to proceed with this at this time.  Overall, she continues to do quite well.  We will see her back in the office in 6 months.  If she does have worsening problems with dysphagia, then I would schedule her for an EGD sooner. 1/3/2023 The patient presents today for follow-up of her reflux and diarrhea.  Since her last visit, she is doing well on famotidine 20 mg at night.  She does still feel that occasionally food gets stuck in the top of her esophagus.  She does feel that she has difficulty swallowing sometimes.  She does not think we need to repeat her EGD with dilation at this time.  She does continue to struggle with her diarrhea.  She is taking the Metamucil daily, and she does think that this helps.  She often forgets to take the dicyclomine.  Today, we have discussed taking the dicyclomine on a more regular basis.  We will see her back in the office in 6 months for further evaluation. 8/8/2023 The patient presents today for follow-up of her reflux with dysphagia and diarrhea.  Since her last visit, she continues on famotidine 20 mg at night with Metamucil in the morning and dicyclomine in the morning as needed.  She continues to complain of occasional dysphagia.  She feels like solids occasionally gets stuck in her throat.  She is also had worsening of her cough.  Today, we have discussed adding in a PPI, but given her osteoporosis, she wants to hold off for now.  We have discussed we will proceed with EGD, and depending on these results, we may want to take a famotidine with low-dose PPI for period of time.  In the meantime, her bowels seem to be moving quite well.  She takes the dicyclomine as needed.  She does take magnesium every night to help her sleep, and we have discussed that this may be contributing to her diarrhea as well.  We will see her back in the office in 6 months for further evaluation. 3/26/2024 The patient presents today for follow-up of her reflux with dysphagia and diarrhea.  Since her last visit, we did perform an EGD with dilation.  Her dysphagia has improved, but she continues to have worsening symptoms of globus and phlegm production.  We have discussed that this may be secondary to allergies along with reflux, but we are going to start her on low-dose PPI.  We are going to start esomeprazole 20 mg daily and stop her famotidine.  She continues to have loose stools in the morning occasionally with urgency.  We are also going to increase her Metamucil to 2 tablets at night.  She denies any blood in her stool.  She is having regular bowel movements.  Overall, she is feeling well from a GI standpoint.  We will see her back in the office in 6 months for further evaluation. 9/24/2024 Lexi presents for follow-up.  Since her last visit she still continues to struggle with loose urgent bowel movements and incontinence.  She is taking the fiber as previously discussed.  She uses dicyclomine but finds this difficult to use consistently.  Today she agrees to a trial of colestipol in addition to her psyllium.  Her reflux has been flaring.  She is taking Nexium before supper, she agrees to add famotidine 40 mg at night.  Overall her symptoms are still persistent.  MB

## 2024-09-26 ENCOUNTER — TELEPHONE ENCOUNTER (OUTPATIENT)
Dept: URBAN - NONMETROPOLITAN AREA CLINIC 2 | Facility: CLINIC | Age: 84
End: 2024-09-26

## 2024-09-26 RX ORDER — FAMOTIDINE 40 MG/1
1 TABLET AT BEDTIME TABLET, FILM COATED ORAL ONCE A DAY
Qty: 90 TABLET | Refills: 3
Start: 2024-09-24

## 2024-09-26 RX ORDER — ESOMEPRAZOLE MAGNESIUM 20 MG/1
1 CAPSULE BEFORE SUPPER CAPSULE, DELAYED RELEASE ORAL ONCE A DAY
Qty: 90 CAPSULE | Refills: 3
Start: 2024-03-26

## 2024-09-26 RX ORDER — COLESTIPOL HYDROCHLORIDE 1 G/1
1 TABLET FIRST THING IN THE AM, SECOND DOSE AT 3 PM AS NEEDED FOR DIARRHEA TABLET, FILM COATED ORAL TWICE DAILY
Qty: 180 | Refills: 3
Start: 2024-09-24

## 2024-12-31 ENCOUNTER — TELEPHONE ENCOUNTER (OUTPATIENT)
Dept: URBAN - NONMETROPOLITAN AREA CLINIC 2 | Facility: CLINIC | Age: 84
End: 2024-12-31

## 2024-12-31 RX ORDER — DICYCLOMINE HYDROCHLORIDE 10 MG/1
1 CAPSULE 30 MINUTES BEFORE EATING CAPSULE ORAL THREE TIMES A DAY
Qty: 90 CAPSULES | Refills: 11
End: 2025-12-26

## 2025-02-03 ENCOUNTER — TELEPHONE ENCOUNTER (OUTPATIENT)
Dept: URBAN - NONMETROPOLITAN AREA CLINIC 2 | Facility: CLINIC | Age: 85
End: 2025-02-03

## 2025-03-25 ENCOUNTER — LAB OUTSIDE AN ENCOUNTER (OUTPATIENT)
Dept: URBAN - NONMETROPOLITAN AREA CLINIC 2 | Facility: CLINIC | Age: 85
End: 2025-03-25

## 2025-03-25 ENCOUNTER — OFFICE VISIT (OUTPATIENT)
Dept: URBAN - NONMETROPOLITAN AREA CLINIC 2 | Facility: CLINIC | Age: 85
End: 2025-03-25
Payer: MEDICARE

## 2025-03-25 DIAGNOSIS — R19.7 DIARRHEA: ICD-10-CM

## 2025-03-25 DIAGNOSIS — K21.00 GASTROESOPHAGEAL REFLUX DISEASE WITH ESOPHAGITIS WITHOUT HEMORRHAGE: ICD-10-CM

## 2025-03-25 DIAGNOSIS — R94.5 ELEVATED LFTS: ICD-10-CM

## 2025-03-25 DIAGNOSIS — Z86.0101 PERSONAL HISTORY OF ADENOMATOUS AND SERRATED COLON POLYPS: ICD-10-CM

## 2025-03-25 DIAGNOSIS — K58.9 IBS (IRRITABLE BOWEL SYNDROME): ICD-10-CM

## 2025-03-25 DIAGNOSIS — R13.19 ESOPHAGEAL DYSPHAGIA: ICD-10-CM

## 2025-03-25 PROBLEM — 428283002: Status: ACTIVE | Noted: 2025-03-25

## 2025-03-25 PROCEDURE — 99214 OFFICE O/P EST MOD 30 MIN: CPT | Performed by: NURSE PRACTITIONER

## 2025-03-25 RX ORDER — FAMOTIDINE 40 MG/1
1 TABLET AT BEDTIME TABLET, FILM COATED ORAL ONCE A DAY
Qty: 90 TABLET | Refills: 3 | Status: ACTIVE | COMMUNITY
Start: 2024-09-24

## 2025-03-25 RX ORDER — ESOMEPRAZOLE MAGNESIUM 20 MG/1
1 CAPSULE BEFORE SUPPER CAPSULE, DELAYED RELEASE ORAL ONCE A DAY
Qty: 90 CAPSULE | Refills: 3 | Status: ACTIVE | COMMUNITY
Start: 2024-03-26

## 2025-03-25 RX ORDER — DICYCLOMINE HYDROCHLORIDE 10 MG/1
1 CAPSULE 30 MINUTES BEFORE EATING CAPSULE ORAL THREE TIMES A DAY
Qty: 90 CAPSULES | Refills: 11 | Status: ACTIVE | COMMUNITY
End: 2025-12-26

## 2025-03-25 RX ORDER — PRAVASTATIN SODIUM 10 MG/1
2 TABLETS TABLET ORAL ONCE A DAY
Status: ACTIVE | COMMUNITY

## 2025-03-25 RX ORDER — COLESTIPOL HYDROCHLORIDE 1 G/1
1 TABLET FIRST THING IN THE AM, SECOND DOSE AT 3 PM AS NEEDED FOR DIARRHEA TABLET, FILM COATED ORAL TWICE DAILY
Qty: 180 | Refills: 3 | Status: ACTIVE | COMMUNITY
Start: 2024-09-24

## 2025-03-25 NOTE — HPI-TODAY'S VISIT:
2-23-21 The patient presents today for follow-up of her diarrhea and elevated liver enzymes.  She also has a history of colon polyps.  She is due for repeat colonoscopy today.  Her diarrhea is normally well controlled with Metamucil.  She is not taking dicyclomine.  She does occasionally have urgency when she is walking in the morning, but otherwise her bowels have been regular.  We have discussed taking the dicyclomine prior to her morning walk.  She is also been having problems with increased dyspepsia.  She occasionally has days where she feels that she needs to burp.  She is not taking anything for reflux, and she has never had an EGD.  We will proceed with EGD when we do her colonoscopy.  Overall, she is feeling well today, and she is willing to proceed with her procedures. 6/24/2021 The patient presents today for follow-up of her EGD and colonoscopy.  She was found to have 1 small benign colon polyp and some mild reflux esophagitis.  We have discussed these results today.  She does continue to have some postnasal drip with some mild dyspepsia.  We have discussed starting on Pepcid, and she does want to do this today.  Her bowels have been fairly well controlled with Metamucil in the evening and dicyclomine as needed.  She will use dicyclomine if she is going for a walk or if she is going out for the day.  Overall, she is doing quite well and her GI symptoms are controlled. 6/21/2022 The patient presents today for follow-up of her diarrhea and reflux.  Since her last visit, she is doing quite well on dicyclomine in the morning.  She will take another dicyclomine when she goes out.  Sometimes she feels that she needs to, and sometimes she feels that 1 controls her symptoms.  Her reflux has been well controlled on famotidine at night.  She does occasionally feel that food gets stuck in her esophagus.  She feels that she has a very small esophagus.  We have discussed repeating her EGD with dilation, but she does not think she needs to proceed with this at this time.  Overall, she continues to do quite well.  We will see her back in the office in 6 months.  If she does have worsening problems with dysphagia, then I would schedule her for an EGD sooner. 1/3/2023 The patient presents today for follow-up of her reflux and diarrhea.  Since her last visit, she is doing well on famotidine 20 mg at night.  She does still feel that occasionally food gets stuck in the top of her esophagus.  She does feel that she has difficulty swallowing sometimes.  She does not think we need to repeat her EGD with dilation at this time.  She does continue to struggle with her diarrhea.  She is taking the Metamucil daily, and she does think that this helps.  She often forgets to take the dicyclomine.  Today, we have discussed taking the dicyclomine on a more regular basis.  We will see her back in the office in 6 months for further evaluation. 8/8/2023 The patient presents today for follow-up of her reflux with dysphagia and diarrhea.  Since her last visit, she continues on famotidine 20 mg at night with Metamucil in the morning and dicyclomine in the morning as needed.  She continues to complain of occasional dysphagia.  She feels like solids occasionally gets stuck in her throat.  She is also had worsening of her cough.  Today, we have discussed adding in a PPI, but given her osteoporosis, she wants to hold off for now.  We have discussed we will proceed with EGD, and depending on these results, we may want to take a famotidine with low-dose PPI for period of time.  In the meantime, her bowels seem to be moving quite well.  She takes the dicyclomine as needed.  She does take magnesium every night to help her sleep, and we have discussed that this may be contributing to her diarrhea as well.  We will see her back in the office in 6 months for further evaluation. 3/26/2024 The patient presents today for follow-up of her reflux with dysphagia and diarrhea.  Since her last visit, we did perform an EGD with dilation.  Her dysphagia has improved, but she continues to have worsening symptoms of globus and phlegm production.  We have discussed that this may be secondary to allergies along with reflux, but we are going to start her on low-dose PPI.  We are going to start esomeprazole 20 mg daily and stop her famotidine.  She continues to have loose stools in the morning occasionally with urgency.  We are also going to increase her Metamucil to 2 tablets at night.  She denies any blood in her stool.  She is having regular bowel movements.  Overall, she is feeling well from a GI standpoint.  We will see her back in the office in 6 months for further evaluation. 9/24/2024 Lexi presents for follow-up.  Since her last visit she still continues to struggle with loose urgent bowel movements and incontinence.  She is taking the fiber as previously discussed.  She uses dicyclomine but finds this difficult to use consistently.  Today she agrees to a trial of colestipol in addition to her psyllium.  Her reflux has been flaring.  She is taking Nexium before supper, she agrees to add famotidine 40 mg at night.  Overall her symptoms are still persistent.  MB 3/25/2025 Lexi presents for follow-up.  Since her last visit she could not swallow the colestipol.  She continues to have multiple loose urgent bowel movements in the morning particularly along with her afternoon walk.  The psyllium does seem to form her stool overnight and her first bowel movement is solid.  Today we have discussed alternatives including Imodium, amitriptyline, Creon, versus powdered Questran.  For now she agrees to try Imodium and if no relief will schedule repeat colonoscopy with random biopsies.  If this is negative would consider amitriptyline versus Creon versus Questran powder.  MB

## 2025-04-07 ENCOUNTER — TELEPHONE ENCOUNTER (OUTPATIENT)
Dept: URBAN - NONMETROPOLITAN AREA CLINIC 13 | Facility: CLINIC | Age: 85
End: 2025-04-07

## 2025-04-28 ENCOUNTER — TELEPHONE ENCOUNTER (OUTPATIENT)
Dept: URBAN - NONMETROPOLITAN AREA CLINIC 13 | Facility: CLINIC | Age: 85
End: 2025-04-28

## 2025-04-28 RX ORDER — SODIUM PICOSULFATE, MAGNESIUM OXIDE, AND ANHYDROUS CITRIC ACID 12; 3.5; 1 G/175ML; G/175ML; MG/175ML
175 ML THE FIRST DOSE THE EVENING BEFORE AND SECOND DOSE THE MORNING OF COLONOSCOPY LIQUID ORAL ONCE A DAY
Qty: 350 | OUTPATIENT
Start: 2025-04-29 | End: 2025-05-01

## 2025-05-01 ENCOUNTER — TELEPHONE ENCOUNTER (OUTPATIENT)
Dept: URBAN - NONMETROPOLITAN AREA CLINIC 13 | Facility: CLINIC | Age: 85
End: 2025-05-01

## 2025-05-01 RX ORDER — SODIUM, POTASSIUM,MAG SULFATES 17.5-3.13G
177 MILLILITER SOLUTION, RECONSTITUTED, ORAL ORAL
Qty: 1 UNSPECIFIED | Refills: 0 | OUTPATIENT
Start: 2025-05-01 | End: 2025-05-02

## 2025-06-26 ENCOUNTER — OFFICE VISIT (OUTPATIENT)
Dept: URBAN - NONMETROPOLITAN AREA SURGERY CENTER 1 | Facility: SURGERY CENTER | Age: 85
End: 2025-06-26

## 2025-07-16 ENCOUNTER — TELEPHONE ENCOUNTER (OUTPATIENT)
Dept: URBAN - NONMETROPOLITAN AREA CLINIC 2 | Facility: CLINIC | Age: 85
End: 2025-07-16

## 2025-07-16 RX ORDER — FAMOTIDINE 40 MG/1
1 TABLET AT BEDTIME TABLET, FILM COATED ORAL ONCE A DAY
Qty: 90 TABLET | Refills: 3
Start: 2024-09-24

## 2025-07-31 ENCOUNTER — TELEPHONE ENCOUNTER (OUTPATIENT)
Dept: URBAN - NONMETROPOLITAN AREA CLINIC 2 | Facility: CLINIC | Age: 85
End: 2025-07-31

## 2025-07-31 ENCOUNTER — OFFICE VISIT (OUTPATIENT)
Dept: URBAN - NONMETROPOLITAN AREA CLINIC 2 | Facility: CLINIC | Age: 85
End: 2025-07-31

## 2025-07-31 RX ORDER — FAMOTIDINE 40 MG/1
1 TABLET AT BEDTIME TABLET, FILM COATED ORAL ONCE A DAY
Qty: 90 TABLET | Refills: 3
Start: 2024-09-24

## 2025-07-31 RX ORDER — FAMOTIDINE 20 MG/1
1 TABLET TABLET ORAL TWICE A DAY
Qty: 180 TABLET | Refills: 3

## 2025-07-31 RX ORDER — FAMOTIDINE 40 MG/1
1 TABLET AT BEDTIME TABLET, FILM COATED ORAL ONCE A DAY
Qty: 90 TABLET | Refills: 3 | Status: ACTIVE | COMMUNITY
Start: 2024-09-24

## 2025-07-31 RX ORDER — PRAVASTATIN SODIUM 10 MG/1
2 TABLETS TABLET ORAL ONCE A DAY
Status: ACTIVE | COMMUNITY

## 2025-07-31 RX ORDER — DICYCLOMINE HYDROCHLORIDE 10 MG/1
1 CAPSULE 30 MINUTES BEFORE EATING CAPSULE ORAL THREE TIMES A DAY
Qty: 90 CAPSULES | Refills: 11 | Status: ACTIVE | COMMUNITY
End: 2025-12-26

## 2025-07-31 RX ORDER — ESOMEPRAZOLE MAGNESIUM 20 MG/1
1 CAPSULE BEFORE SUPPER CAPSULE, DELAYED RELEASE ORAL ONCE A DAY
Qty: 90 CAPSULE | Refills: 3 | Status: ACTIVE | COMMUNITY
Start: 2024-03-26

## 2025-07-31 RX ORDER — COLESTIPOL HYDROCHLORIDE 1 G/1
1 TABLET FIRST THING IN THE AM, SECOND DOSE AT 3 PM AS NEEDED FOR DIARRHEA TABLET, FILM COATED ORAL TWICE DAILY
Qty: 180 | Refills: 3 | Status: ACTIVE | COMMUNITY
Start: 2024-09-24

## 2025-07-31 NOTE — HPI-TODAY'S VISIT:
7/31/2025 Lexi Hawkins, an 84-year-old female, presented for a chronic condition follow-up focused on her ongoing gastrointestinal symptoms and a new skin concern. She described a longstanding issue with diarrhea, which she manages with daily Imodium. She sometimes feels constipated and adjusts her dose by taking half a tablet in the morning as needed. Recently, she attempted to improve her health by trying a supplement powder, but this resulted in severe diarrhea and cramping that lasted nearly a week, prompting her to discontinue its use. She continues to rely on Imodium to maintain regularity and reports that this approach has been effective for her. In addition to her bowel symptoms, she discussed her experience with gastroesophageal reflux disease. She currently takes esomeprazole before supper and inquired about the appropriate timing for Famotidine, expressing concern about potential side effects, particularly bone thinning, given her history of osteoporosis. She noted that her reflux symptoms are generally well controlled but occasionally experiences an upset stomach in the morning and some discomfort or bloating after meals, especially breakfast. She admitted to not always taking Famotidine at bedtime and is currently out of it, but she is open to adjusting her medication regimen based on her symptoms and provider recommendations. MB